# Patient Record
(demographics unavailable — no encounter records)

---

## 2024-11-06 NOTE — HISTORY OF PRESENT ILLNESS
[Mother] : mother [Influenza] : Influenza [Eats meals with family] : eats meals with family [Grade: ____] : Grade: [unfilled] [Eats regular meals including adequate fruits and vegetables] : eats regular meals including adequate fruits and vegetables [Has friends] : has friends [At least 1 hour of physical activity a day] : at least 1 hour of physical activity a day [Uses electronic nicotine delivery system] : does not use electronic nicotine delivery system [Uses tobacco] : does not use tobacco [Uses drugs] : does not use drugs  [Drinks alcohol] : does not drink alcohol [No] : No cigarette smoke exposure [Yes] : Patient has had sexual intercourse. [Always] : Condom use: always [Female ___] : # of current female partners: [unfilled]  [de-identified] : cyst on spine that hurts, comes and goes for months [de-identified] : failing.

## 2024-11-06 NOTE — PHYSICAL EXAM

## 2024-11-06 NOTE — DISCUSSION/SUMMARY
[Physical Growth and Development] : physical growth and development [Social and Academic Competence] : social and academic competence [Emotional Well-Being] : emotional well-being [Risk Reduction] : risk reduction [Violence and Injury Prevention] : violence and injury prevention [Mother] : mother [FreeTextEntry1] :  17 year male growing and developing well. Presents today with infected pilonidal cyst.  Continue balanced diet with all food groups. Brush teeth twice a day with toothbrush. Recommend visit to dentist. Maintain consistent daily routines and sleep schedule. Personal hygiene, puberty, and sexual health reviewed. Risky behaviors assessed. School discussed. Limit screen time to no more than 2 hours per day. Encourage physical activity. Return 1 year for routine well child check.

## 2024-11-06 NOTE — HISTORY OF PRESENT ILLNESS
[Mother] : mother [Influenza] : Influenza [Eats meals with family] : eats meals with family [Grade: ____] : Grade: [unfilled] [Eats regular meals including adequate fruits and vegetables] : eats regular meals including adequate fruits and vegetables [Has friends] : has friends [At least 1 hour of physical activity a day] : at least 1 hour of physical activity a day [Uses electronic nicotine delivery system] : does not use electronic nicotine delivery system [Uses tobacco] : does not use tobacco [Uses drugs] : does not use drugs  [Drinks alcohol] : does not drink alcohol [No] : No cigarette smoke exposure [Yes] : Patient has had sexual intercourse. [Always] : Condom use: always [Female ___] : # of current female partners: [unfilled]  [de-identified] : cyst on spine that hurts, comes and goes for months [de-identified] : failing.

## 2024-11-06 NOTE — PHYSICAL EXAM

## 2024-11-20 NOTE — CONSULT LETTER
[Dear  ___] : Dear  [unfilled], [Consult Letter:] : I had the pleasure of evaluating your patient, [unfilled]. [Please see my note below.] : Please see my note below. [Consult Closing:] : Thank you very much for allowing me to participate in the care of this patient.  If you have any questions, please do not hesitate to contact me. [Sincerely,] : Sincerely, [FreeTextEntry2] : Jeni Blair MD     Pediatrician in [place], [place] Website [employer] [address] [place] [address]    Pediatrician in [place], [place] Website [employer] [address] [place] [address] [FreeTextEntry3] : Ramsey Benavidez MD FASCRS

## 2024-11-20 NOTE — ASSESSMENT
[FreeTextEntry1] : 17-year-old young man with symptomatic pilonidal cyst abscess and multiple additional pilonidal cyst along the intragluteal midline fold I discussed with Niall and his mother surgical management.  I provided written information on postoperative wound care.  We will move forward with surgical scheduling.

## 2024-11-20 NOTE — PROCEDURE
[FreeTextEntry1] :     Discussed with the patient the indications for surgical management recommendation.  We reviewed the aspects of the surgical procedure.  I also reviewed with the patient the written information provided today on postoperative management including  general activity,  bowel management with a focus on avoiding constipation, dietary management, wound care management, pain management.  Also discussed what issues such as bleeding or fever would invite a phone call to the office for communication and management. All questions answered to the best of ability.

## 2024-11-20 NOTE — PHYSICAL EXAM
[FreeTextEntry1] : Examination reveals along the intragluteal fold a abscess drainage pocket of approximately 1 cm at surface, in addition to 2 additional pilonidal pits with hair inspissation along the intragluteal fold for a total length of 8 cm estimated.

## 2024-11-20 NOTE — HISTORY OF PRESENT ILLNESS
[FreeTextEntry1] : Rafael Stevens, 17-year-old young man, presents with his mother to discuss potential management of a chronic symptomatic pilonidal cyst abscess.  Rafael reports the onset of symptoms approximately 1 year ago.  The area waxes and wanes with swelling and discomfort and drainage.  Currently Rafael is not having systemic symptoms of fever or chills.  Dr Blair recently obtained a culture, November 6, 2024 of the abscess.  The results are reviewed.

## 2024-12-12 NOTE — HISTORY OF PRESENT ILLNESS
[FreeTextEntry1] : Rafael presents with his mother to review the upcoming surgical management of his chronic pilonidal disease.  Niall practices martial arts for athletic enjoyment and reported recently that he has had "staph infections of the skin" which upon description sounds like some pimple-like boils.  He denies fever or chills.  Anorectal examination reveals again a chronic pilonidal cyst abscess complex.  There are multiple pets.  I reviewed with the patient and mother the anticipated surgical management and postoperative care.  Written information on anticipated postoperative care provided again to the family.  I indicated to the patient that patients with chronic bacterial infections can get episodic bacteremia and that there can be at times a miliary spread, however in this situation I am cautious about assigning the pilonidal situation to the patient's reported dermatologic concerns.  Again moving forward with surgical management without undue delay is my recommendation.  All questions answered.

## 2025-01-15 NOTE — HISTORY OF PRESENT ILLNESS
[FreeTextEntry1] : Rafael and his mother return for postoperative evaluation.  He underwent last week surgical management of a complex pilonidal cyst abscess.  Postoperatively he has done well without any complications Examination of the wound today shows a soupy granulation.  I discussed with the mother age slightly more gentle aggressive management of the base in order to remove this soupy tissue to allow for firm healing from base up to surface.  Like to see the patient in 1 to 2 weeks to assess the ongoing home wound management.  Otherwise no complications on today's examination.

## 2025-01-23 NOTE — ASSESSMENT
[FreeTextEntry1] : Wound is healing well. No s/s of infection or bleeding noted. I reviewed wound care management with his mother and to continue current wound care regimen. Otherwise, no complications noted on exam today.   Patient to follow up in 2 weeks to assess wound.

## 2025-01-23 NOTE — HISTORY OF PRESENT ILLNESS
[FreeTextEntry1] : Mr. Stevens is a 17 year old male being seen for a post op visit s/p pilonidal cyst abscess removal

## 2025-02-05 NOTE — PROCEDURE
[FreeTextEntry1] : [facility]    Patient Name: NEIL MULLEN        Location:   [facility]     YOB: 2007        PCP:    Jeni Blair MD Age:17      Sex:  M       Attending:  Ramsey Benavidez MD   OPERATIVE REPORT  DATE OF PROCEDURE:  01/08/2025  SURGEON: Ramsey Benavidez MD  PREOPERATIVE DIAGNOSIS:  Pilonidal cyst abscess, complex, chronic.  POSTOPERATIVE DIAGNOSIS:  Pilonidal cyst abscess, complex, chronic.  PROCEDURE PERFORMED:  Excision of pilonidal cyst abscess, complex, complicated CPT code 74458.  ASSISTANT:  Bryan Vega MD  ANESTHESIA:  MD Yuki  PROCEDURE IN DETAIL:  After informed consent from both the patient and his parents, the patient was brought into the operating room, placed on table in supine position, put to sleep and intubated under general anesthesia without complication.  The patient was positioned amol-knife prone.  All pressure points were padded.  The perineum was shaved of hair, prepped and draped in a sterile fashion.  The patient presented with a chronic symptomatic pilonidal cyst abscess, complex of greater than 1 year duration.  Physical examination revealed pilonidal cyst abscess complex with subcutaneous fistulization to an draining chronic abscess with approximately 8 cm in total length and 2-3 cm in estimated width.  After prepping the area and a time-out performed.  Local anesthesia was infiltrated into the local wound area.  Next using a 15 scalpel and needle- tip electrocautery Bovie.  The epidermis and dermis were excised circumferentially around the cyst abscess complex.  Using electrocautery, the cyst abscess complex was completely excised on block without violation of the cyst wall.  The tissues were submitted for histologic evaluation.  The wound was irrigated and meticulous careful hemostasis was achieved with a gentle Bovie cauterization of any dermal skin bleeders or potential bleeders.  At the conclusion of the case, Exparel was administered for additional long- acting local analgesia.  A cloth sheath was placed over the length of the wound to promote additional postoperative hemostasis.  4 x 4 gauze were placed into the open wound as it was too wide for primary reapproximation. Following the administration of a sterile gauze dressing.  The patient was taken into the supine position, awoken, extubated, taken to the recovery room stable.  Dictated by:  Ramsey Benavidez MD

## 2025-02-05 NOTE — HISTORY OF PRESENT ILLNESS
[FreeTextEntry1] : Niall returns following surgical management of a pilonidal cyst abscess.  He he is doing well without complaints Physical exam shows a healthy wound.  Hair shaving and hair management is performed today.  There is some soupy granulation tissue which is managed with silver nitrate.  Niall is doing well.  I discussed ongoing wound care with the patient and with his mother by phone.  I did ask him to return in 2 weeks with his mother to monitor the wound healing process.  He can also resume normal activities as he feels he is ready.

## 2025-02-05 NOTE — CONSULT LETTER
[Please see my note below.] : Please see my note below. [Consult Closing:] : Thank you very much for allowing me to participate in the care of this patient.  If you have any questions, please do not hesitate to contact me. [Sincerely,] : Sincerely, [FreeTextEntry2] : Jeni Blair MD  [FreeTextEntry3] : Ramsey Benavidez MD FASCRS

## 2025-02-19 NOTE — HISTORY OF PRESENT ILLNESS
[FreeTextEntry1] : Rafael returns for ongoing postoperative wound healing monitoring.  He presents with his mother.  He reports he is back to full physical activities including his martial arts.  Physical exam reveals the pilonidal wound area to be 90% healed.  The area is very clean.  There is good hair management Continue current wound care.  I discussed with the patient and his mother that in a week or 2 the Medihoney may not be necessary.  I invite the patient to return in 1 month for surveillance examination.

## 2025-04-02 NOTE — HISTORY OF PRESENT ILLNESS
[FreeTextEntry1] : Mr. Stevens is a 17 year old who is here for a post op visit after excision of pilonidal cyst on 1/8/2025.  [place] presents with his mother.  No complaints Physical exam today shows the wounds to be completely healed.  Hair is shaved from the immediate perimeter.  Discussed with patient the importance of hair management.  Otherwise he is invited to return for a follow-up visit in 3 to 6 months if he chooses.  Wounds have completely healed and postoperative wound management is now complete.